# Patient Record
Sex: MALE | Race: BLACK OR AFRICAN AMERICAN | NOT HISPANIC OR LATINO | URBAN - METROPOLITAN AREA
[De-identification: names, ages, dates, MRNs, and addresses within clinical notes are randomized per-mention and may not be internally consistent; named-entity substitution may affect disease eponyms.]

---

## 2022-11-13 ENCOUNTER — EMERGENCY (EMERGENCY)
Facility: HOSPITAL | Age: 37
LOS: 1 days | Discharge: HOME | End: 2022-11-13
Admitting: EMERGENCY MEDICINE

## 2022-11-13 VITALS
RESPIRATION RATE: 18 BRPM | SYSTOLIC BLOOD PRESSURE: 125 MMHG | TEMPERATURE: 98 F | DIASTOLIC BLOOD PRESSURE: 100 MMHG | OXYGEN SATURATION: 99 % | HEART RATE: 86 BPM

## 2022-11-13 DIAGNOSIS — F25.8 OTHER SCHIZOAFFECTIVE DISORDERS: ICD-10-CM

## 2022-11-13 LAB
ALBUMIN SERPL ELPH-MCNC: 4.8 G/DL — SIGNIFICANT CHANGE UP (ref 3.3–5)
ALP SERPL-CCNC: 77 U/L — SIGNIFICANT CHANGE UP (ref 40–120)
ALT FLD-CCNC: 37 U/L — SIGNIFICANT CHANGE UP (ref 4–41)
AMPHET UR-MCNC: NEGATIVE — SIGNIFICANT CHANGE UP
ANION GAP SERPL CALC-SCNC: 11 MMOL/L — SIGNIFICANT CHANGE UP (ref 7–14)
APAP SERPL-MCNC: <10 UG/ML — LOW (ref 15–25)
APPEARANCE UR: CLEAR — SIGNIFICANT CHANGE UP
AST SERPL-CCNC: 35 U/L — SIGNIFICANT CHANGE UP (ref 4–40)
B PERT DNA SPEC QL NAA+PROBE: SIGNIFICANT CHANGE UP
B PERT+PARAPERT DNA PNL SPEC NAA+PROBE: SIGNIFICANT CHANGE UP
BACTERIA # UR AUTO: NEGATIVE — SIGNIFICANT CHANGE UP
BARBITURATES UR SCN-MCNC: NEGATIVE — SIGNIFICANT CHANGE UP
BASOPHILS # BLD AUTO: 0.04 K/UL — SIGNIFICANT CHANGE UP (ref 0–0.2)
BASOPHILS NFR BLD AUTO: 0.5 % — SIGNIFICANT CHANGE UP (ref 0–2)
BENZODIAZ UR-MCNC: NEGATIVE — SIGNIFICANT CHANGE UP
BILIRUB SERPL-MCNC: 0.3 MG/DL — SIGNIFICANT CHANGE UP (ref 0.2–1.2)
BILIRUB UR-MCNC: NEGATIVE — SIGNIFICANT CHANGE UP
BORDETELLA PARAPERTUSSIS (RAPRVP): SIGNIFICANT CHANGE UP
BUN SERPL-MCNC: 11 MG/DL — SIGNIFICANT CHANGE UP (ref 7–23)
C PNEUM DNA SPEC QL NAA+PROBE: SIGNIFICANT CHANGE UP
CALCIUM SERPL-MCNC: 10.1 MG/DL — SIGNIFICANT CHANGE UP (ref 8.4–10.5)
CHLORIDE SERPL-SCNC: 100 MMOL/L — SIGNIFICANT CHANGE UP (ref 98–107)
CO2 SERPL-SCNC: 25 MMOL/L — SIGNIFICANT CHANGE UP (ref 22–31)
COCAINE METAB.OTHER UR-MCNC: NEGATIVE — SIGNIFICANT CHANGE UP
COLOR SPEC: YELLOW — SIGNIFICANT CHANGE UP
COVID-19 SPIKE DOMAIN AB INTERP: POSITIVE
COVID-19 SPIKE DOMAIN ANTIBODY RESULT: >250 U/ML — HIGH
CREAT SERPL-MCNC: 1.09 MG/DL — SIGNIFICANT CHANGE UP (ref 0.5–1.3)
CREATININE URINE RESULT, DAU: 259 MG/DL — SIGNIFICANT CHANGE UP
DIFF PNL FLD: NEGATIVE — SIGNIFICANT CHANGE UP
EGFR: 89 ML/MIN/1.73M2 — SIGNIFICANT CHANGE UP
EOSINOPHIL # BLD AUTO: 0.25 K/UL — SIGNIFICANT CHANGE UP (ref 0–0.5)
EOSINOPHIL NFR BLD AUTO: 3.4 % — SIGNIFICANT CHANGE UP (ref 0–6)
EPI CELLS # UR: 0 /HPF — SIGNIFICANT CHANGE UP (ref 0–5)
ETHANOL SERPL-MCNC: <10 MG/DL — SIGNIFICANT CHANGE UP
FLUAV SUBTYP SPEC NAA+PROBE: SIGNIFICANT CHANGE UP
FLUBV RNA SPEC QL NAA+PROBE: SIGNIFICANT CHANGE UP
GLUCOSE SERPL-MCNC: 104 MG/DL — HIGH (ref 70–99)
GLUCOSE UR QL: NEGATIVE — SIGNIFICANT CHANGE UP
HADV DNA SPEC QL NAA+PROBE: SIGNIFICANT CHANGE UP
HCOV 229E RNA SPEC QL NAA+PROBE: SIGNIFICANT CHANGE UP
HCOV HKU1 RNA SPEC QL NAA+PROBE: SIGNIFICANT CHANGE UP
HCOV NL63 RNA SPEC QL NAA+PROBE: SIGNIFICANT CHANGE UP
HCOV OC43 RNA SPEC QL NAA+PROBE: SIGNIFICANT CHANGE UP
HCT VFR BLD CALC: 45.9 % — SIGNIFICANT CHANGE UP (ref 39–50)
HGB BLD-MCNC: 15.6 G/DL — SIGNIFICANT CHANGE UP (ref 13–17)
HMPV RNA SPEC QL NAA+PROBE: SIGNIFICANT CHANGE UP
HPIV1 RNA SPEC QL NAA+PROBE: SIGNIFICANT CHANGE UP
HPIV2 RNA SPEC QL NAA+PROBE: SIGNIFICANT CHANGE UP
HPIV3 RNA SPEC QL NAA+PROBE: SIGNIFICANT CHANGE UP
HPIV4 RNA SPEC QL NAA+PROBE: SIGNIFICANT CHANGE UP
HYALINE CASTS # UR AUTO: 1 /LPF — SIGNIFICANT CHANGE UP (ref 0–7)
IANC: 3.37 K/UL — SIGNIFICANT CHANGE UP (ref 1.8–7.4)
IMM GRANULOCYTES NFR BLD AUTO: 0.3 % — SIGNIFICANT CHANGE UP (ref 0–0.9)
KETONES UR-MCNC: NEGATIVE — SIGNIFICANT CHANGE UP
LEUKOCYTE ESTERASE UR-ACNC: NEGATIVE — SIGNIFICANT CHANGE UP
LYMPHOCYTES # BLD AUTO: 2.89 K/UL — SIGNIFICANT CHANGE UP (ref 1–3.3)
LYMPHOCYTES # BLD AUTO: 39 % — SIGNIFICANT CHANGE UP (ref 13–44)
M PNEUMO DNA SPEC QL NAA+PROBE: SIGNIFICANT CHANGE UP
MCHC RBC-ENTMCNC: 30 PG — SIGNIFICANT CHANGE UP (ref 27–34)
MCHC RBC-ENTMCNC: 34 GM/DL — SIGNIFICANT CHANGE UP (ref 32–36)
MCV RBC AUTO: 88.3 FL — SIGNIFICANT CHANGE UP (ref 80–100)
METHADONE UR-MCNC: NEGATIVE — SIGNIFICANT CHANGE UP
MONOCYTES # BLD AUTO: 0.84 K/UL — SIGNIFICANT CHANGE UP (ref 0–0.9)
MONOCYTES NFR BLD AUTO: 11.3 % — SIGNIFICANT CHANGE UP (ref 2–14)
NEUTROPHILS # BLD AUTO: 3.37 K/UL — SIGNIFICANT CHANGE UP (ref 1.8–7.4)
NEUTROPHILS NFR BLD AUTO: 45.5 % — SIGNIFICANT CHANGE UP (ref 43–77)
NITRITE UR-MCNC: NEGATIVE — SIGNIFICANT CHANGE UP
NRBC # BLD: 0 /100 WBCS — SIGNIFICANT CHANGE UP (ref 0–0)
NRBC # FLD: 0 K/UL — SIGNIFICANT CHANGE UP (ref 0–0)
OPIATES UR-MCNC: NEGATIVE — SIGNIFICANT CHANGE UP
OXYCODONE UR-MCNC: NEGATIVE — SIGNIFICANT CHANGE UP
PCP SPEC-MCNC: SIGNIFICANT CHANGE UP
PCP UR-MCNC: NEGATIVE — SIGNIFICANT CHANGE UP
PH UR: 6.5 — SIGNIFICANT CHANGE UP (ref 5–8)
PLATELET # BLD AUTO: 284 K/UL — SIGNIFICANT CHANGE UP (ref 150–400)
POTASSIUM SERPL-MCNC: 4.2 MMOL/L — SIGNIFICANT CHANGE UP (ref 3.5–5.3)
POTASSIUM SERPL-SCNC: 4.2 MMOL/L — SIGNIFICANT CHANGE UP (ref 3.5–5.3)
PROT SERPL-MCNC: 8.3 G/DL — SIGNIFICANT CHANGE UP (ref 6–8.3)
PROT UR-MCNC: ABNORMAL
RAPID RVP RESULT: SIGNIFICANT CHANGE UP
RBC # BLD: 5.2 M/UL — SIGNIFICANT CHANGE UP (ref 4.2–5.8)
RBC # FLD: 13.7 % — SIGNIFICANT CHANGE UP (ref 10.3–14.5)
RBC CASTS # UR COMP ASSIST: 3 /HPF — SIGNIFICANT CHANGE UP (ref 0–4)
RSV RNA SPEC QL NAA+PROBE: SIGNIFICANT CHANGE UP
RV+EV RNA SPEC QL NAA+PROBE: SIGNIFICANT CHANGE UP
SALICYLATES SERPL-MCNC: <0.3 MG/DL — LOW (ref 15–30)
SARS-COV-2 IGG+IGM SERPL QL IA: >250 U/ML — HIGH
SARS-COV-2 IGG+IGM SERPL QL IA: POSITIVE
SARS-COV-2 RNA SPEC QL NAA+PROBE: SIGNIFICANT CHANGE UP
SODIUM SERPL-SCNC: 136 MMOL/L — SIGNIFICANT CHANGE UP (ref 135–145)
SP GR SPEC: 1.03 — SIGNIFICANT CHANGE UP (ref 1.01–1.05)
THC UR QL: NEGATIVE — SIGNIFICANT CHANGE UP
TOXICOLOGY SCREEN, DRUGS OF ABUSE, SERUM RESULT: SIGNIFICANT CHANGE UP
TSH SERPL-MCNC: 1.32 UIU/ML — SIGNIFICANT CHANGE UP (ref 0.27–4.2)
UROBILINOGEN FLD QL: SIGNIFICANT CHANGE UP
WBC # BLD: 7.41 K/UL — SIGNIFICANT CHANGE UP (ref 3.8–10.5)
WBC # FLD AUTO: 7.41 K/UL — SIGNIFICANT CHANGE UP (ref 3.8–10.5)
WBC UR QL: 1 /HPF — SIGNIFICANT CHANGE UP (ref 0–5)

## 2022-11-13 PROCEDURE — 99285 EMERGENCY DEPT VISIT HI MDM: CPT

## 2022-11-13 PROCEDURE — 93010 ELECTROCARDIOGRAM REPORT: CPT

## 2022-11-13 PROCEDURE — 90792 PSYCH DIAG EVAL W/MED SRVCS: CPT | Mod: GC

## 2022-11-13 NOTE — ED PROVIDER NOTE - CLINICAL SUMMARY MEDICAL DECISION MAKING FREE TEXT BOX
Labs, Urine Tox/UA, EKG  Medical evaluation performed. There is no clinical evidence of intoxication or any acute medical problem requiring immediate intervention. Patient is awaiting psychiatric consultation. Final disposition will be determined by psychiatrist. 38 y/o  M   Labs, Urine Tox/UA, EKG  Medical evaluation performed. There is no clinical evidence of intoxication or any acute medical problem requiring immediate intervention. Patient is awaiting psychiatric consultation. Final disposition will be determined by psychiatrist.

## 2022-11-13 NOTE — ED BEHAVIORAL HEALTH ASSESSMENT NOTE - HPI (INCLUDE ILLNESS QUALITY, SEVERITY, DURATION, TIMING, CONTEXT, MODIFYING FACTORS, ASSOCIATED SIGNS AND SYMPTOMS)
36 yo M, domiciled with his mother at [facility], single, no dependents, unemployed, PPH of Schizoaffective d/o, has active ACT team per Psyches and pt, 3 prior inpt hospitalizations last in 2019, no PMH, no h/o aggression/violence, remote h/o MJ use. Pt presented to ED today with his mother who came in for a COVID test. Per report from triage, triage nurse had concern regarding pt's presentation (flight of ideas, acting bizarre), and a psychiatric evaluation was called.     Writer met with patient. Pt presents as pleasant, calm, in good behavioral control, at times tangential. On interview, pt states that he is here because his mother came for a COVID test and he requested one as well; he is not sure why he was brought to psychiatry. When asked about his psychiatric history, pt discusses history of seeing "shadow people" who he feels have been after his daughter, now 21 years old. He states he has been seeing these shadow people and hearing voices on and off for many years. He states the voices "only talk when they are losing." He denies that they speak directly to him. He denies command auditory hallucinations to harm himself or others. Content focused on vague delusions related to the shadow people and "little girls" that are looking over his daughter to protect her. He denies taking any action toward the shadow people or feeling threatened by them, stating "they wouldn't harm me."He endorses good sleep and appetite. He endorses good relationship with his mother, who lives with him in the shelter. 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 He denies depressed mood, current or lifetime h/o SIIP, HIIP, or thoughts of self-harm. He denies h/o SA. He states he was last hospitalized for a psychiatric reason 3 years ago. He states he was prescribed Risperidone 2 mg which he took for some time but stopped taking years ago. He states he has an ACT team. He denies following with a psychiatrist or therapist. He endorses remote h/o smoking MJ, which he states made him "bugging out," but states he went to substance treatment for MJ and has not used in multiple years. He endorses smoking 3 cigarettes per day. He denies etoh or other drug use.     Writer and attending spoke with patient's mother, who was admitted for COVID test. Patient's mother states she lives in the shelter with her son and is with him 24/7. She states they came here for her to get a COVID test. She reports that she did not have any psychiatric concerns, and did not bring him to be evaluated psychiatrically. She corroborates that her son discusses hearing voices/seeing things and reports concern over not being able to track his daughter, who was in the foster system but is now an adult. She endorses that he does speak about a "little girl watching her" which she does not believe to be true. She reports that overall he is happy, gets along well with everybody at the shelter. She denies h/o violence/aggression. She denies that his symptoms are interfering with his ability to function. She feels that he is at his baseline and feels comfortable taking him home. She does not have any safety concerns and does not wish for him to be admitted to psychiatry. 38 yo M, domiciled with his mother at [facility], single, no dependents, unemployed, PPH of Schizoaffective d/o, has active ACT team per Psyches and pt, 3 prior inpt hospitalizations last in 2019, no PMH, no h/o aggression/violence, remote h/o MJ use. Pt presented to ED today with his mother who came via cab for a COVID test for herself. Patient was triaged to [facility] at the behest of triage nurse who had concern regarding pt's presentation in the waiting area, and a psychiatric evaluation was called. Mother, who was interviewed as a collateral informant for the patient and who lives with her son in the shelter, reports that she was in "the area" and came to get tested for COVID, however, she did not bring her son in for a psychiatric evaluation (see below). As a consult was called and patient was moved to  and put into a gown owing to the concerns of the triage team and provider, a psychiatric evaluation was performed with the patient's consent.    Writer met with patient. Pt presents as pleasant, calm, in good behavioral control, at times tangential. On interview, pt states that he is here because his mother came for a COVID test and he requested one as well; he is not sure why he was brought to psychiatry. When asked about his psychiatric history, pt discusses history of seeing "shadow people" who he feels have been after his daughter, now 21 years old. He states he has been seeing these shadow people and hearing voices on and off for many years. He states the voices "only talk when they are losing." He denies that they speak directly to him. He denies command auditory hallucinations to harm himself or others. Content focused on vague delusions related to the shadow people and "little girls" that are looking over his daughter to protect her. He denies taking any action toward the shadow people or feeling threatened by them, stating "they wouldn't harm me."He endorses good sleep and appetite. He endorses good relationship with his mother, who lives with him in the shelter. 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 He denies depressed mood, current or lifetime h/o SIIP, HIIP, or thoughts of self-harm. He denies h/o SA. He states he was last hospitalized for a psychiatric reason 3 years ago. He states he was prescribed Risperidone 2 mg which he took for some time but stopped taking years ago. He states he has an ACT team but "I don't take medicine anymore". ARIANNA indicates and active ACT team. He denies following with a psychiatrist or therapist. He endorses remote h/o smoking MJ, which he states made him "bugging out," but states he went to substance treatment for MJ and has not used in multiple years. He endorses smoking 3 cigarettes per day. He denies etoh or other drug use.    Writer and attending spoke with patient's mother, who was admitted for COVID test. Patient's mother states she lives in the shelter with her son and is with him 24/7. She states they came here for her to get a COVID test. She reports that she did not have any psychiatric concerns, and did not bring him to be evaluated psychiatrically. She corroborates that her son discusses hearing voices/seeing things and reports concern over not being able to track his daughter, who was in the foster system but is now an adult. She endorses that he does speak about a "little girl watching her" which she does not believe to be true. She reports that overall he is happy, gets along well with everybody at the shelter. She denies h/o violence/aggression. She denies that his symptoms are interfering with his ability to function. She feels that he is at his baseline and feels comfortable taking him home. She does not have any safety concerns and does not wish for him to be admitted to psychiatry.

## 2022-11-13 NOTE — ED BEHAVIORAL HEALTH ASSESSMENT NOTE - DESCRIPTION
Pt is calm and cooperative. No IMs/Prns/Restraints given.    ICU Vital Signs Last 24 Hrs  T(C): 36.9 (13 Nov 2022 13:51), Max: 36.9 (13 Nov 2022 13:51)  T(F): 98.4 (13 Nov 2022 13:51), Max: 98.4 (13 Nov 2022 13:51)  HR: 86 (13 Nov 2022 13:51) (86 - 86)  BP: 125/100 (13 Nov 2022 13:51) (125/100 - 125/100)  BP(mean): --  ABP: --  ABP(mean): --  RR: 18 (13 Nov 2022 13:51) (18 - 18)  SpO2: 99% (13 Nov 2022 13:51) (99% - 99%)    O2 Parameters below as of 13 Nov 2022 13:51  Patient On (Oxygen Delivery Method): room air n/a domiciled with his mother at [facility], single, no dependents, 3 children 21, 17, and 14 but do not live with pt, unemployed

## 2022-11-13 NOTE — ED ADULT TRIAGE NOTE - CHIEF COMPLAINT QUOTE
states " here for covid vaccination".  states " lives in  shelter, wants to get checked" speaks incoherently of daughters spirits.   states off all psych meds and " I am good now" from [facility]

## 2022-11-13 NOTE — ED BEHAVIORAL HEALTH ASSESSMENT NOTE - NSBHATTESTCOMMENTATTENDFT_PSY_A_CORE
37 year-old with history of schizoaffective disorder, presented to hospital accompanying mother who was coming for a COVID test, patient was registered after he seemed disorganized in triage. Presentation is consistent with a chronic psychosis with poor insight, essentially untreated. Mother reports that she lives with him, and they are nearly inseparable. She reports that his delusions are longstanding, related to loss of his daughter to the foster care system, and his desire to be reunited with her. She reports that he has never been aggressive, is well-regarded in the shelter, not dangerous, no history of self harm, no substance misuse. With her help, he is adequately cared for and does not pose a danger to her, himself, or anyone else. Although we offered psychiatric treatment, including inpatient admission, it was declined. There is no evidence to support an involuntary admission, and patient will be discharged, returning to shelter via cab.

## 2022-11-13 NOTE — ED BEHAVIORAL HEALTH ASSESSMENT NOTE - SUMMARY
36 yo M, domiciled with his mother at [facility], single, no dependents, unemployed, PPH of Schizoaffective d/o, has active ACT team per Psyches and pt, 3 prior inpt hospitalizations last in 2019, no PMH, no h/o aggression/violence, remote h/o MJ use. Pt presented to ED today with his mother who came in for a COVID test. Per report from triage, triage nurse had concern regarding pt's presentation (flight of ideas, acting bizarre), and a psychiatric evaluation was called.     Pt is presenting with psychosis which appears to be at his chronic baseline. On exam, pt is pressured and tangential at times but redirectible. Pt is reporting chronic delusions of persecution which are not distressing to the patient at this time and have not resulted in behaviors that are an acute safety concern at this time. Per collateral from pt's mother, pt is at his chronic baseline, and she has no safety concerns at this time. The patient and his mother are not interested in psychiatric treatment at this time. As the patient is not currently a danger to himself or others, and is able to care for himself, he does not meet criteria for involuntary psychiatric hospitalization.      Plan:  - Treat and release  - Attempted to reach ACT team but unable to reach.   - Patient and mother know to contact 911/bring pt to ED should there be any acute safety concerns. 36 yo M, domiciled with his mother at [facility], single, no dependents, unemployed, PPH of Schizoaffective d/o, has active ACT team per Psyches and pt, 3 prior inpt hospitalizations last in 2019, no PMH, no h/o aggression/violence, remote h/o MJ use, not currently taking meds but being followed by ACT team, presented with mother who wanted a COVID test for herself, psychiatric evaluation requested after patient seemed disorganized in the waiting area.    Pt is presenting with psychosis which appears to be at his chronic baseline. On exam, pt is slightly  pressured of speech and tangential at times but redirectible, and no motor agitation at all, very calm and cooperative.  Pt is reporting chronic delusions of persecution which are not distressing to the patient at this time and have not resulted in behaviors that are an acute safety concern at this time. Per collateral from pt's mother, pt is at his chronic baseline, and she has no safety concerns at this time. The patient and his mother are not interested in psychiatric treatment at this time. As the patient is not currently a danger to himself or others, and is able to care for himself, he does not meet criteria for involuntary psychiatric hospitalization.  Patient declines psychiatric treatment inpatient or outpatient.    Plan:  - Treat and release  - Attempted to reach ACT team but unable to reach.   - Patient and mother know to contact 911/bring pt to ED should there be any acute safety concerns.

## 2022-11-13 NOTE — ED BEHAVIORAL HEALTH ASSESSMENT NOTE - RISK ASSESSMENT
Low Acute Suicide Risk Pt is presenting with acute sxs of psychosis. Pt denies CAH. He denies depressed mood, SIIP/HIIP or thoughts of self-harm.    RF: prior hositalization, h/o schizoaffective d/o,   PF: no prior SA/SI/NSSIB, no prior h/o aggression, supportive mother, ACT team, no substance use, no lethal means.    As the patient is not currently a danger to himself or others, and is able to care for himself, he does not meet criteria for involuntary psychiatric hospitalization.

## 2022-11-13 NOTE — ED ADULT NURSE NOTE - OBJECTIVE STATEMENT
Received pt in  pt requesting Covid test pt denies si/hi/avh presently, emotional support provided eval on going.

## 2022-11-13 NOTE — ED PROVIDER NOTE - OBJECTIVE STATEMENT
38 y/o  M   BIB mother requesting a COVID test.  Mother states that she and her son lives in a shelter  in [place].  Patient appears paranoid, expressing a flight of ideas regarding demons. Denies SI/HI/AH/VH.  Denies pain, SOB, fever, chills, chest/abdominal discomfort.  Denies falling, punching or kicking any objects.  No evidence of physical injuries, broken  skin or deformity.  Denies   use of alcohol or  illicit drugs.

## 2022-11-13 NOTE — ED ADULT NURSE NOTE - CHIEF COMPLAINT QUOTE
states " here for covid vaccination".  states " lives in  shelter, wants to get checked" speaks incoherently of daughters spirits.   states off all psych meds and " I am good now" from [facility]

## 2022-11-13 NOTE — ED BEHAVIORAL HEALTH ASSESSMENT NOTE - DETAILS
spoke with triage team see hpi no acute SIIP/HIIP/ self-harm. Pt and mother know to return to ED/call 911 should they have acute safety concerns.

## 2022-11-13 NOTE — ED PROVIDER NOTE - PATIENT PORTAL LINK FT
You can access the FollowMyHealth Patient Portal offered by [facility] by registering at the following website: [email or web address]. By joining [facility]’s FollowMyHealth portal, you will also be able to view your health information using other applications (apps) compatible with our system.